# Patient Record
Sex: MALE | Race: OTHER | URBAN - METROPOLITAN AREA
[De-identification: names, ages, dates, MRNs, and addresses within clinical notes are randomized per-mention and may not be internally consistent; named-entity substitution may affect disease eponyms.]

---

## 2017-04-13 ENCOUNTER — EMERGENCY (EMERGENCY)
Facility: HOSPITAL | Age: 37
LOS: 1 days | Discharge: HOME | End: 2017-04-13
Attending: EMERGENCY MEDICINE
Payer: COMMERCIAL

## 2017-04-13 VITALS
OXYGEN SATURATION: 98 % | SYSTOLIC BLOOD PRESSURE: 131 MMHG | HEART RATE: 87 BPM | TEMPERATURE: 98 F | HEIGHT: 68 IN | RESPIRATION RATE: 18 BRPM | WEIGHT: 160.06 LBS | DIASTOLIC BLOOD PRESSURE: 70 MMHG

## 2017-04-13 DIAGNOSIS — M25.562 PAIN IN LEFT KNEE: ICD-10-CM

## 2017-04-13 PROCEDURE — 99283 EMERGENCY DEPT VISIT LOW MDM: CPT | Mod: 25

## 2017-04-13 PROCEDURE — 99284 EMERGENCY DEPT VISIT MOD MDM: CPT | Mod: 25

## 2017-04-13 PROCEDURE — 73562 X-RAY EXAM OF KNEE 3: CPT

## 2017-04-13 PROCEDURE — 73562 X-RAY EXAM OF KNEE 3: CPT | Mod: 26,LT

## 2017-04-13 RX ORDER — IBUPROFEN 200 MG
600 TABLET ORAL ONCE
Qty: 0 | Refills: 0 | Status: COMPLETED | OUTPATIENT
Start: 2017-04-13 | End: 2017-04-13

## 2017-04-13 RX ADMIN — Medication 600 MILLIGRAM(S): at 23:02

## 2017-04-13 NOTE — ED PROVIDER NOTE - MUSCULOSKELETAL, MLM
Spine appears normal, TTP medial aspect of his L knee Spine appears normal, TTP medial aspect of his L knee. no bruising. neg valgus/varus stress. neg ant/post drawer test

## 2017-04-13 NOTE — ED PROVIDER NOTE - OBJECTIVE STATEMENT
37 y/o M pt with no significant PMHx presents to ED c/o L knee pain and swelling that started today; pain worsened when walking or extending the leg. Pt notes doing heavy lifting at work today and standing up for long periods of time at work today. Pt reports talking ibuprofen for sx for no relief. Pt denies fever, chills, trauma, fall, numbness, tingling, weakness, or any other  complaints. NKDA 35 y/o M pt with no significant PMHx presents to ED c/o L knee pain and swelling that started today; pain worsened when walking or extending the leg. Pt notes doing heavy lifting at work today and standing up for long periods of time at work today. Pt reports talking ibuprofen for sx for with some relief. Pt denies fever, chills, trauma, fall, numbness, tingling, weakness, or any other  complaints. NKDA

## 2017-04-13 NOTE — ED PROVIDER NOTE - CONDUCTED A DETAILED DISCUSSION WITH PATIENT AND/OR GUARDIAN REGARDING, MDM
need for outpatient follow-up/return to ED if symptoms worsen, persist or questions arise need for outpatient follow-up/radiology results/return to ED if symptoms worsen, persist or questions arise

## 2017-04-13 NOTE — ED ADULT NURSE NOTE - OBJECTIVE STATEMENT
AOX3 +ambulatory patient reports L jose AOX3 +ambulatory patient reports L knee pain s/p lifting wood today. Patient noted with redness and swelling no trauma

## 2017-04-13 NOTE — ED PROVIDER NOTE - MEDICAL DECISION MAKING DETAILS
atraumatic knee pain, no swelling or bruising. normal exam. improved with analgesia. xr neg. discussed anticipatory guidance. pt has cane. ace wrapped. referred to ortho as outpt

## 2017-04-13 NOTE — ED PROVIDER NOTE - NS ED MD SCRIBE ATTENDING SCRIBE SECTIONS
PHYSICAL EXAM/HIV/VITAL SIGNS( Pullset)/HISTORY OF PRESENT ILLNESS/DISPOSITION/REVIEW OF SYSTEMS/PAST MEDICAL/SURGICAL/SOCIAL HISTORY

## 2017-04-14 VITALS
DIASTOLIC BLOOD PRESSURE: 73 MMHG | HEART RATE: 86 BPM | SYSTOLIC BLOOD PRESSURE: 126 MMHG | TEMPERATURE: 98 F | RESPIRATION RATE: 16 BRPM | OXYGEN SATURATION: 99 %
